# Patient Record
Sex: FEMALE | URBAN - METROPOLITAN AREA
[De-identification: names, ages, dates, MRNs, and addresses within clinical notes are randomized per-mention and may not be internally consistent; named-entity substitution may affect disease eponyms.]

---

## 2017-05-24 ENCOUNTER — DOCUMENTATION ONLY (OUTPATIENT)
Dept: TRANSPLANT | Facility: CLINIC | Age: 22
End: 2017-05-24

## 2017-05-24 NOTE — LETTER
May 24, 2017    Regan Alvarado  [address] [facility] [address]      Dear Regan Alvarado:    Your doctor has referred you to the [facility]. You will be contacted by our office and an initial appointment will then be scheduled for you.    We look forward to seeing you soon. If you have any further questions, please contact us at [phone].       Sincerely,        [facility]   [address] [place] [id number]  [phone]

## 2017-05-24 NOTE — NURSING
Pt records reviewed.  Pt will be referred to Hepatology due to current alcohol use, normal labs,varicies with bleed.   Initial referral received  from jonathan Silverman's  office.   Referral letter sent to provider and patient.  Pt records reviewed.

## 2017-07-07 ENCOUNTER — TELEPHONE (OUTPATIENT)
Dept: HEPATOLOGY | Facility: CLINIC | Age: 22
End: 2017-07-07

## 2017-07-07 NOTE — TELEPHONE ENCOUNTER
MA attempted to call patient to schedule her Initial visit to see liver speciaslit. Patient unable to reached left her VM to please give us a callback. NAE

## 2017-08-14 ENCOUNTER — TELEPHONE (OUTPATIENT)
Dept: HEPATOLOGY | Facility: CLINIC | Age: 22
End: 2017-08-14

## 2017-08-14 NOTE — TELEPHONE ENCOUNTER
MA called patient back, schedule her Initial visit in [place]. Mailed appt reminder to patient. NAE

## 2017-08-14 NOTE — TELEPHONE ENCOUNTER
----- Message from Albertina Saini LPN sent at 8/14/2017 10:39 AM CDT -----  Called received requesting that the patient be scheduled to see Dr Avila in [place].  Patient contact information is [phone].  Please call and schedule and let me know what happens.  Thanks